# Patient Record
Sex: FEMALE | Race: WHITE | NOT HISPANIC OR LATINO | URBAN - METROPOLITAN AREA
[De-identification: names, ages, dates, MRNs, and addresses within clinical notes are randomized per-mention and may not be internally consistent; named-entity substitution may affect disease eponyms.]

---

## 2018-02-06 ENCOUNTER — NEW PATIENT (OUTPATIENT)
Dept: URBAN - METROPOLITAN AREA CLINIC 24 | Facility: CLINIC | Age: 58
End: 2018-02-06
Payer: MEDICAID

## 2018-02-06 DIAGNOSIS — Z79.4 LONG TERM (CURRENT) USE OF INSULIN: ICD-10-CM

## 2018-02-06 DIAGNOSIS — R42 DIZZINESS AND GIDDINESS: ICD-10-CM

## 2018-02-06 DIAGNOSIS — H52.4 PRESBYOPIA: ICD-10-CM

## 2018-02-06 PROCEDURE — 92004 COMPRE OPH EXAM NEW PT 1/>: CPT | Performed by: OPTOMETRIST

## 2018-02-06 PROCEDURE — 92250 FUNDUS PHOTOGRAPHY W/I&R: CPT | Performed by: OPTOMETRIST

## 2018-02-06 ASSESSMENT — KERATOMETRY
OD: 45.55
OS: 45.59

## 2018-02-06 ASSESSMENT — VISUAL ACUITY
OD: 20/20
OS: 20/25

## 2018-02-06 ASSESSMENT — INTRAOCULAR PRESSURE
OS: 21
OD: 21

## 2019-03-22 ENCOUNTER — FOLLOW UP ESTABLISHED (OUTPATIENT)
Dept: URBAN - METROPOLITAN AREA CLINIC 24 | Facility: CLINIC | Age: 59
End: 2019-03-22
Payer: MEDICAID

## 2019-03-22 DIAGNOSIS — H25.13 AGE-RELATED NUCLEAR CATARACT, BILATERAL: ICD-10-CM

## 2019-03-22 DIAGNOSIS — E11.9 TYPE 2 DIABETES MELLITUS WITHOUT COMPLICATIONS: Primary | ICD-10-CM

## 2019-03-22 PROCEDURE — 92014 COMPRE OPH EXAM EST PT 1/>: CPT | Performed by: OPTOMETRIST

## 2019-03-22 PROCEDURE — 92134 CPTRZ OPH DX IMG PST SGM RTA: CPT | Performed by: OPTOMETRIST

## 2019-03-22 ASSESSMENT — INTRAOCULAR PRESSURE
OD: 20
OS: 20

## 2019-03-22 ASSESSMENT — KERATOMETRY
OD: 45.70
OS: 45.49

## 2019-03-22 ASSESSMENT — VISUAL ACUITY
OS: 20/25
OD: 20/20

## 2020-09-16 ENCOUNTER — FOLLOW UP ESTABLISHED (OUTPATIENT)
Dept: URBAN - METROPOLITAN AREA CLINIC 24 | Facility: CLINIC | Age: 60
End: 2020-09-16
Payer: MEDICAID

## 2020-09-16 PROCEDURE — 92014 COMPRE OPH EXAM EST PT 1/>: CPT | Performed by: OPTOMETRIST

## 2020-09-16 PROCEDURE — 92134 CPTRZ OPH DX IMG PST SGM RTA: CPT | Performed by: OPTOMETRIST

## 2020-09-16 ASSESSMENT — VISUAL ACUITY
OD: 20/25
OS: 20/50

## 2020-09-16 ASSESSMENT — KERATOMETRY: OD: 45.80

## 2020-09-16 ASSESSMENT — INTRAOCULAR PRESSURE
OS: 21
OD: 20

## 2020-11-09 ENCOUNTER — Encounter (OUTPATIENT)
Dept: URBAN - METROPOLITAN AREA CLINIC 26 | Facility: CLINIC | Age: 60
End: 2020-11-09
Payer: MEDICAID

## 2020-11-09 DIAGNOSIS — H25.813 COMBINED FORMS OF AGE-RELATED CATARACT, BILATERAL: ICD-10-CM

## 2020-11-09 DIAGNOSIS — Z01.818 ENCOUNTER FOR OTHER PREPROCEDURAL EXAMINATION: Primary | ICD-10-CM

## 2020-11-09 PROCEDURE — 99213 OFFICE O/P EST LOW 20 MIN: CPT | Performed by: PHYSICIAN ASSISTANT

## 2020-11-09 PROCEDURE — 99202 OFFICE O/P NEW SF 15 MIN: CPT | Performed by: PHYSICIAN ASSISTANT

## 2020-11-09 RX ORDER — FUROSEMIDE 40 MG/1
40 MG TABLET ORAL
Qty: 0 | Refills: 0 | Status: ACTIVE
Start: 2020-11-09

## 2020-11-09 RX ORDER — INSULIN GLARGINE 100 [IU]/ML
INJECTION, SOLUTION SUBCUTANEOUS
Qty: 0 | Refills: 0 | Status: ACTIVE
Start: 2020-11-09

## 2020-11-11 ENCOUNTER — NEW PATIENT (OUTPATIENT)
Dept: URBAN - METROPOLITAN AREA CLINIC 24 | Facility: CLINIC | Age: 60
End: 2020-11-11
Payer: MEDICAID

## 2020-11-11 DIAGNOSIS — H43.393 OTHER VITREOUS OPACITIES, BILATERAL: ICD-10-CM

## 2020-11-11 DIAGNOSIS — H04.123 TEAR FILM INSUFFICIENCY OF BILATERAL LACRIMAL GLANDS: ICD-10-CM

## 2020-11-11 DIAGNOSIS — H52.13 MYOPIA, BILATERAL: ICD-10-CM

## 2020-11-11 PROCEDURE — 92004 COMPRE OPH EXAM NEW PT 1/>: CPT | Performed by: OPHTHALMOLOGY

## 2020-11-11 ASSESSMENT — INTRAOCULAR PRESSURE
OD: 18
OS: 18

## 2020-11-18 ENCOUNTER — SURGERY (OUTPATIENT)
Dept: URBAN - METROPOLITAN AREA SURGERY 12 | Facility: SURGERY | Age: 60
End: 2020-11-18
Payer: MEDICAID

## 2020-11-18 PROCEDURE — 66984 XCAPSL CTRC RMVL W/O ECP: CPT | Performed by: OPHTHALMOLOGY

## 2020-11-19 ENCOUNTER — POST OP (OUTPATIENT)
Dept: URBAN - METROPOLITAN AREA CLINIC 24 | Facility: CLINIC | Age: 60
End: 2020-11-19
Payer: MEDICAID

## 2020-11-19 PROCEDURE — 99024 POSTOP FOLLOW-UP VISIT: CPT | Performed by: OPTOMETRIST

## 2020-11-19 ASSESSMENT — INTRAOCULAR PRESSURE: OS: 25

## 2020-11-25 ENCOUNTER — POST OP (OUTPATIENT)
Dept: URBAN - METROPOLITAN AREA CLINIC 24 | Facility: CLINIC | Age: 60
End: 2020-11-25
Payer: MEDICAID

## 2020-11-25 PROCEDURE — 99024 POSTOP FOLLOW-UP VISIT: CPT | Performed by: OPTOMETRIST

## 2020-11-25 ASSESSMENT — INTRAOCULAR PRESSURE
OS: 17
OD: 17

## 2020-11-25 ASSESSMENT — VISUAL ACUITY
OS: 20/20
OD: 20/25

## 2020-12-02 ENCOUNTER — SURGERY (OUTPATIENT)
Dept: URBAN - METROPOLITAN AREA SURGERY 12 | Facility: SURGERY | Age: 60
End: 2020-12-02
Payer: MEDICAID

## 2020-12-02 PROCEDURE — 66984 XCAPSL CTRC RMVL W/O ECP: CPT | Performed by: OPHTHALMOLOGY

## 2020-12-03 ENCOUNTER — POST OP (OUTPATIENT)
Dept: URBAN - METROPOLITAN AREA CLINIC 24 | Facility: CLINIC | Age: 60
End: 2020-12-03
Payer: MEDICAID

## 2020-12-03 DIAGNOSIS — Z96.1 PRESENCE OF INTRAOCULAR LENS: Primary | ICD-10-CM

## 2020-12-03 PROCEDURE — 99024 POSTOP FOLLOW-UP VISIT: CPT | Performed by: OPTOMETRIST

## 2020-12-03 ASSESSMENT — INTRAOCULAR PRESSURE: OD: 12

## 2021-01-14 ENCOUNTER — POST OP (OUTPATIENT)
Dept: URBAN - METROPOLITAN AREA CLINIC 24 | Facility: CLINIC | Age: 61
End: 2021-01-14
Payer: MEDICAID

## 2021-01-14 PROCEDURE — 99024 POSTOP FOLLOW-UP VISIT: CPT | Performed by: OPTOMETRIST

## 2021-01-14 ASSESSMENT — VISUAL ACUITY
OS: 20/20
OD: 20/20

## 2021-01-14 ASSESSMENT — INTRAOCULAR PRESSURE
OD: 16
OS: 17

## 2021-12-16 ENCOUNTER — OFFICE VISIT (OUTPATIENT)
Dept: URBAN - METROPOLITAN AREA CLINIC 24 | Facility: CLINIC | Age: 61
End: 2021-12-16
Payer: MEDICAID

## 2021-12-16 DIAGNOSIS — E11.3293 TYPE 2 DIAB WITH MILD NONP RTNOP WITHOUT MACULAR EDEMA, BI: Primary | ICD-10-CM

## 2021-12-16 DIAGNOSIS — H26.493 OTHER SECONDARY CATARACT, BILATERAL: ICD-10-CM

## 2021-12-16 PROCEDURE — 92014 COMPRE OPH EXAM EST PT 1/>: CPT | Performed by: OPTOMETRIST

## 2021-12-16 PROCEDURE — 92134 CPTRZ OPH DX IMG PST SGM RTA: CPT | Performed by: OPTOMETRIST

## 2021-12-16 ASSESSMENT — INTRAOCULAR PRESSURE
OD: 16
OS: 16

## 2021-12-16 ASSESSMENT — VISUAL ACUITY
OD: 20/20
OS: 20/20

## 2021-12-16 ASSESSMENT — KERATOMETRY
OS: 45.58
OD: 45.64

## 2021-12-16 NOTE — IMPRESSION/PLAN
Impression: Type 2 diab with mild nonp rtnop without macular edema, bi: [id number].
-- dx ~2015 per pt Plan: Mild non-proliferative diabetic retinopathy, no signs of neovascularization noted. No treatment necessary at this time. Patient was instructed to monitor vision for sudden changes, told to call if changes noted. Discussed ocular and systemic benefits of blood sugar control.

## 2023-04-17 ENCOUNTER — OFFICE VISIT (OUTPATIENT)
Dept: URBAN - METROPOLITAN AREA CLINIC 24 | Facility: CLINIC | Age: 63
End: 2023-04-17
Payer: MEDICAID

## 2023-04-17 DIAGNOSIS — E11.3293 TYPE 2 DIAB WITH MILD NONP RTNOP WITHOUT MACULAR EDEMA, BI: Primary | ICD-10-CM

## 2023-04-17 DIAGNOSIS — Z79.4 LONG TERM (CURRENT) USE OF INSULIN: ICD-10-CM

## 2023-04-17 DIAGNOSIS — H26.493 OTHER SECONDARY CATARACT, BILATERAL: ICD-10-CM

## 2023-04-17 DIAGNOSIS — H43.813 VITREOUS DEGENERATION, BILATERAL: ICD-10-CM

## 2023-04-17 PROCEDURE — 92014 COMPRE OPH EXAM EST PT 1/>: CPT | Performed by: OPTOMETRIST

## 2023-04-17 PROCEDURE — 92250 FUNDUS PHOTOGRAPHY W/I&R: CPT | Performed by: OPTOMETRIST

## 2023-04-17 ASSESSMENT — INTRAOCULAR PRESSURE
OD: 16
OS: 16

## 2023-04-17 ASSESSMENT — KERATOMETRY
OS: 45.83
OD: 45.77

## 2023-04-17 ASSESSMENT — VISUAL ACUITY
OD: 20/20
OS: 20/20

## 2023-04-17 NOTE — IMPRESSION/PLAN
Impression: Type 2 diab with mild nonp rtnop without macular edema, bi: [id number].
-- dx ~2015 per pt Plan: Mild non-proliferative diabetic retinopathy, no signs of neovascularization noted. No treatment necessary at this time. Patient was instructed to monitor vision for sudden changes, told to call if changes noted. Discussed ocular and systemic benefits of blood sugar control. Appears stable from previous.

## 2024-04-18 ENCOUNTER — OFFICE VISIT (OUTPATIENT)
Dept: URBAN - METROPOLITAN AREA CLINIC 24 | Facility: CLINIC | Age: 64
End: 2024-04-18
Payer: MEDICAID

## 2024-04-18 DIAGNOSIS — H26.493 OTHER SECONDARY CATARACT, BILATERAL: ICD-10-CM

## 2024-04-18 DIAGNOSIS — E11.3293 TYPE 2 DIABETES MELLITUS WITH MILD NONPROLIFERATIVE DIABETIC RETINOPATHY WITHOUT MACULAR EDEMA, BILATERAL: Primary | ICD-10-CM

## 2024-04-18 DIAGNOSIS — Z79.84 LONG TERM (CURRENT) USE OF ORAL ANTIDIABETIC DRUGS: ICD-10-CM

## 2024-04-18 PROCEDURE — 92250 FUNDUS PHOTOGRAPHY W/I&R: CPT | Performed by: OPTOMETRIST

## 2024-04-18 PROCEDURE — 92014 COMPRE OPH EXAM EST PT 1/>: CPT | Performed by: OPTOMETRIST

## 2024-04-18 ASSESSMENT — INTRAOCULAR PRESSURE
OS: 16
OD: 17

## 2024-04-18 ASSESSMENT — KERATOMETRY
OS: 45.45
OD: 45.60

## 2024-04-18 ASSESSMENT — VISUAL ACUITY
OD: 20/20
OS: 20/20

## 2025-06-05 ENCOUNTER — OFFICE VISIT (OUTPATIENT)
Dept: URBAN - METROPOLITAN AREA CLINIC 24 | Facility: CLINIC | Age: 65
End: 2025-06-05
Payer: MEDICAID

## 2025-06-05 DIAGNOSIS — E11.3393 TYPE 2 DIAB W MODERATE NONPRLF DIAB RTNOP W/O MACULAR EDEMA, BILATERAL: Primary | ICD-10-CM

## 2025-06-05 DIAGNOSIS — Z79.84 LONG TERM (CURRENT) USE OF ORAL ANTIDIABETIC DRUGS: ICD-10-CM

## 2025-06-05 DIAGNOSIS — H26.493 OTHER SECONDARY CATARACT, BILATERAL: ICD-10-CM

## 2025-06-05 DIAGNOSIS — H35.63 RETINAL HEMORRHAGE, BILATERAL: ICD-10-CM

## 2025-06-05 PROCEDURE — 92250 FUNDUS PHOTOGRAPHY W/I&R: CPT | Performed by: OPTOMETRIST

## 2025-06-05 PROCEDURE — 92014 COMPRE OPH EXAM EST PT 1/>: CPT | Performed by: OPTOMETRIST

## 2025-06-05 ASSESSMENT — INTRAOCULAR PRESSURE
OS: 18
OD: 18

## 2025-06-05 ASSESSMENT — VISUAL ACUITY
OS: 20/20
OD: 20/20

## 2025-06-05 ASSESSMENT — KERATOMETRY
OD: 45.70
OS: 45.88

## 2025-07-08 ENCOUNTER — OFFICE VISIT (OUTPATIENT)
Dept: URBAN - METROPOLITAN AREA CLINIC 24 | Facility: CLINIC | Age: 65
End: 2025-07-08
Payer: MEDICAID

## 2025-07-08 DIAGNOSIS — E11.3393 TYPE 2 DIABETES MELLITUS WITH MODERATE NONPROLIFERATIVE DIABETIC RETINOPATHY WITHOUT MACULAR EDEMA, BILATERAL: Primary | ICD-10-CM

## 2025-07-08 DIAGNOSIS — H26.493 OTHER SECONDARY CATARACT, BILATERAL: ICD-10-CM

## 2025-07-08 PROCEDURE — 92004 COMPRE OPH EXAM NEW PT 1/>: CPT | Performed by: OPHTHALMOLOGY

## 2025-07-08 PROCEDURE — 92235 FLUORESCEIN ANGRPH MLTIFRAME: CPT | Performed by: OPHTHALMOLOGY

## 2025-07-08 PROCEDURE — 92134 CPTRZ OPH DX IMG PST SGM RTA: CPT | Performed by: OPHTHALMOLOGY

## 2025-07-08 ASSESSMENT — INTRAOCULAR PRESSURE
OD: 14
OS: 13